# Patient Record
Sex: FEMALE | Race: WHITE | NOT HISPANIC OR LATINO | Employment: OTHER | URBAN - METROPOLITAN AREA
[De-identification: names, ages, dates, MRNs, and addresses within clinical notes are randomized per-mention and may not be internally consistent; named-entity substitution may affect disease eponyms.]

---

## 2017-11-10 ENCOUNTER — HOSPITAL ENCOUNTER (EMERGENCY)
Facility: OTHER | Age: 42
Discharge: HOME | End: 2017-11-11
Attending: EMERGENCY MEDICINE
Payer: COMMERCIAL

## 2017-11-10 DIAGNOSIS — E87.6 HYPOKALEMIA: ICD-10-CM

## 2017-11-10 DIAGNOSIS — R55 SYNCOPE, UNSPECIFIED SYNCOPE TYPE: Primary | ICD-10-CM

## 2017-11-10 LAB
B-HCG UR QL: NEGATIVE
BASOPHILS # BLD AUTO: 0.02 K/UL
BASOPHILS NFR BLD: 0.1 %
CTP QC/QA: YES
DIFFERENTIAL METHOD: ABNORMAL
EOSINOPHIL # BLD AUTO: 0.3 K/UL
EOSINOPHIL NFR BLD: 1.8 %
ERYTHROCYTE [DISTWIDTH] IN BLOOD BY AUTOMATED COUNT: 13.5 %
HCT VFR BLD AUTO: 38.1 %
HGB BLD-MCNC: 13.2 G/DL
LYMPHOCYTES # BLD AUTO: 2.5 K/UL
LYMPHOCYTES NFR BLD: 15.2 %
MCH RBC QN AUTO: 28.3 PG
MCHC RBC AUTO-ENTMCNC: 34.6 G/DL
MCV RBC AUTO: 82 FL
MONOCYTES # BLD AUTO: 0.9 K/UL
MONOCYTES NFR BLD: 5.3 %
NEUTROPHILS # BLD AUTO: 12.6 K/UL
NEUTROPHILS NFR BLD: 77.4 %
PLATELET # BLD AUTO: 388 K/UL
PMV BLD AUTO: 9.7 FL
RBC # BLD AUTO: 4.67 M/UL
WBC # BLD AUTO: 16.28 K/UL

## 2017-11-10 PROCEDURE — 85025 COMPLETE CBC W/AUTO DIFF WBC: CPT

## 2017-11-10 PROCEDURE — 80048 BASIC METABOLIC PNL TOTAL CA: CPT

## 2017-11-10 PROCEDURE — 25000003 PHARM REV CODE 250: Performed by: EMERGENCY MEDICINE

## 2017-11-10 PROCEDURE — 81025 URINE PREGNANCY TEST: CPT | Performed by: EMERGENCY MEDICINE

## 2017-11-10 PROCEDURE — 93005 ELECTROCARDIOGRAM TRACING: CPT

## 2017-11-10 PROCEDURE — 99284 EMERGENCY DEPT VISIT MOD MDM: CPT | Mod: 25

## 2017-11-10 PROCEDURE — 93010 ELECTROCARDIOGRAM REPORT: CPT | Mod: ,,, | Performed by: INTERNAL MEDICINE

## 2017-11-10 RX ORDER — LOSARTAN POTASSIUM AND HYDROCHLOROTHIAZIDE 12.5; 5 MG/1; MG/1
1 TABLET ORAL DAILY
COMMUNITY

## 2017-11-10 RX ORDER — ESCITALOPRAM OXALATE 10 MG/1
10 TABLET ORAL DAILY
COMMUNITY

## 2017-11-10 RX ORDER — DEXTROAMPHETAMINE SACCHARATE, AMPHETAMINE ASPARTATE MONOHYDRATE, DEXTROAMPHETAMINE SULFATE AND AMPHETAMINE SULFATE 5; 5; 5; 5 MG/1; MG/1; MG/1; MG/1
20 CAPSULE, EXTENDED RELEASE ORAL EVERY MORNING
COMMUNITY

## 2017-11-10 RX ORDER — ALPRAZOLAM 1 MG/1
1 TABLET ORAL 2 TIMES DAILY
COMMUNITY

## 2017-11-10 RX ADMIN — SODIUM CHLORIDE 1000 ML: 0.9 INJECTION, SOLUTION INTRAVENOUS at 11:11

## 2017-11-11 VITALS
HEIGHT: 63 IN | SYSTOLIC BLOOD PRESSURE: 141 MMHG | RESPIRATION RATE: 23 BRPM | DIASTOLIC BLOOD PRESSURE: 81 MMHG | HEART RATE: 86 BPM | BODY MASS INDEX: 33.66 KG/M2 | TEMPERATURE: 99 F | WEIGHT: 190 LBS | OXYGEN SATURATION: 97 %

## 2017-11-11 LAB
ANION GAP SERPL CALC-SCNC: 10 MMOL/L
BUN SERPL-MCNC: 14 MG/DL
CALCIUM SERPL-MCNC: 9.4 MG/DL
CHLORIDE SERPL-SCNC: 98 MMOL/L
CO2 SERPL-SCNC: 29 MMOL/L
CREAT SERPL-MCNC: 1 MG/DL
EST. GFR  (AFRICAN AMERICAN): >60 ML/MIN/1.73 M^2
EST. GFR  (NON AFRICAN AMERICAN): >60 ML/MIN/1.73 M^2
GLUCOSE SERPL-MCNC: 97 MG/DL
POTASSIUM SERPL-SCNC: 3.1 MMOL/L
SODIUM SERPL-SCNC: 137 MMOL/L

## 2017-11-11 PROCEDURE — 25000003 PHARM REV CODE 250: Performed by: EMERGENCY MEDICINE

## 2017-11-11 RX ORDER — POTASSIUM CHLORIDE 20 MEQ/1
40 TABLET, EXTENDED RELEASE ORAL
Status: COMPLETED | OUTPATIENT
Start: 2017-11-11 | End: 2017-11-11

## 2017-11-11 RX ADMIN — POTASSIUM CHLORIDE 40 MEQ: 1500 TABLET, EXTENDED RELEASE ORAL at 12:11

## 2017-11-11 NOTE — ED TRIAGE NOTES
"Pt arrived to ER via EMS. Pt reports " I was at an event tonight and I started to feel hot so I was going to the bathroom and I blacked out. I don't remember anything. I had a couple drinks but not enough to pass out. My left leg feels sore and I have a headache." Pt denies CP, SOB, lightheadedness, dizziness.   "

## 2017-11-11 NOTE — ED PROVIDER NOTES
"Encounter Date: 11/10/2017    SCRIBE #1 NOTE: I, Rossy Sweet , am scribing for, and in the presence of, Dr. Price .       History     Chief Complaint   Patient presents with    Loss of Consciousness     patient was at an event at the [facility]; had a jose miguel and wine, began feeling hot so got up to go to the bathroom to put water on her face and blacked  out; currently aa&o, c/o L leg pain      Time seen by provider: 10:45 PM    This is a 42 y.o. female, with history of anemia and HTN, who presents via EMS with complaint of loss of consciousness that occurred prior to arrival. The patient felt "overheated and clamy" while sitting at a table. She attempted to walk to the bathroom, but stood still after several steps. The patient recalls waking up on the floor. She currently reports fatigue which has been constant and dizziness, but denies fever, chills, appetite change, nausea, vomiting, chest pain, palpitations, SOB, or headache. Dizziness occurs when the patient sits up or attempts to walk. The patient reports "walking more than usual" today. She reports FHx of MI at the age of 48.        The history is provided by the patient and the spouse.     Review of patient's allergies indicates:   Allergen Reactions    Azithromycin     Keflex [cephalexin]     Pcn [penicillins]      Past Medical History:   Diagnosis Date    Hypertension      Past Surgical History:   Procedure Laterality Date    BREAST SURGERY       History reviewed. No pertinent family history.  Social History   Substance Use Topics    Smoking status: Never Smoker    Smokeless tobacco: Never Used    Alcohol use Yes     Review of Systems   Constitutional: Positive for diaphoresis (resolved) and fatigue. Negative for appetite change, chills and fever.   HENT: Negative for congestion and sore throat.    Eyes: Negative for photophobia and redness.   Respiratory: Negative for cough and shortness of breath.    Cardiovascular: Negative for chest pain " and palpitations.   Gastrointestinal: Negative for abdominal pain, nausea and vomiting.   Genitourinary: Negative for dysuria.   Musculoskeletal: Negative for back pain.   Skin: Negative for rash.   Neurological: Positive for dizziness and syncope. Negative for weakness and headaches.   Psychiatric/Behavioral: Negative for confusion.       Physical Exam     Initial Vitals [11/10/17 2222]   BP Pulse Resp Temp SpO2   117/77 69 11 98.8 °F (37.1 °C) 98 %      MAP       90.33         Physical Exam    Nursing note and vitals reviewed.  Constitutional: She appears well-developed and well-nourished. She is not diaphoretic. No distress.   HENT:   Head: Normocephalic and atraumatic.   Right Ear: External ear normal.   Left Ear: External ear normal.   Mildly dry mucous membranes.    Eyes: EOM are normal. Pupils are equal, round, and reactive to light. Right eye exhibits no discharge. Left eye exhibits no discharge.   Neck: Normal range of motion.   Cardiovascular: Normal rate, regular rhythm and normal heart sounds. Exam reveals no gallop and no friction rub.    No murmur heard.  Pulmonary/Chest: Breath sounds normal. No respiratory distress. She has no wheezes. She has no rhonchi. She has no rales.   Abdominal: Soft. There is no tenderness. There is no rebound and no guarding.   Musculoskeletal: Normal range of motion. She exhibits no edema or tenderness.   Neurological: She is alert and oriented to person, place, and time.   Skin: Skin is warm and dry. No rash and no abscess noted. No erythema. No pallor.   Psychiatric: She has a normal mood and affect. Her behavior is normal. Judgment and thought content normal.         ED Course   Procedures  Labs Reviewed   CBC W/ AUTO DIFFERENTIAL - Abnormal; Notable for the following:        Result Value    WBC 16.28 (*)     Platelets 388 (*)     Gran # 12.6 (*)     Gran% 77.4 (*)     Lymph% 15.2 (*)     All other components within normal limits   BASIC METABOLIC PANEL - Abnormal;  Notable for the following:     Potassium 3.1 (*)     All other components within normal limits   POCT URINE PREGNANCY     EKG Readings: (Independently Interpreted)   Initial Reading: No STEMI.   Sinus rhythm. Widened QRS due to RBBB. No priors for comparison.          Medical Decision Making:   Clinical Tests:   Lab Tests: Ordered and Reviewed  Medical Tests: Ordered and Reviewed  ED Management:  12:10 AM- The patient is feeling better. She is able to stand and go to the bathroom without dizziness.               *   ED Course    I personally performed the history, physical exam and medical decision making; the documentation recorded by the scribe accurately reflects the service I performed and the decisions made by me.        Patient was at a dinner function, had had 2 glasses of wine, started to feel hot and flushed, stood to go to the bathroom and had a brief loss of consciousness.  She was described by medical personnel on the scene is having low heart rate and low blood pressure.   reports that she was very sweaty and pale looking.  She has not had similar episodes in the past no recent illness she is feeling better upon arrival to the emergency department.  Laboratory studies show a nonspecific leukocytosis however she has no recent infectious symptoms, is afebrile here.  Also with mild hypokalemia she is on diuretic advised her to use over-the-counter potassium supplements.  Will be given dose of potassium here.  Her EKG does show conduction block however no old available for comparison and she has no arrhythmia while on the monitor.  No ischemic changes on EKG.  No chest pain preceding the event.  After the fluids and observation she is feeling much better, is able to stand and ambulate without dizziness.  I suspect this was a vagal mediated syncope.  Encouraged follow-up with her primary care upon return    Clinical Impression:     1. Syncope, unspecified syncope type    2. Hypokalemia                               Donnie Price II, MD  11/11/17 [id number]

## 2017-11-11 NOTE — ED NOTES
"Pt ambulated to bathroom with no problem. Pt states " I feel much better" . NAD noted. MD aware. Will continue to monitor    "